# Patient Record
Sex: FEMALE | Race: WHITE | NOT HISPANIC OR LATINO | URBAN - METROPOLITAN AREA
[De-identification: names, ages, dates, MRNs, and addresses within clinical notes are randomized per-mention and may not be internally consistent; named-entity substitution may affect disease eponyms.]

---

## 2018-01-23 ENCOUNTER — IMPORTED ENCOUNTER (OUTPATIENT)
Dept: URBAN - METROPOLITAN AREA CLINIC 1 | Facility: CLINIC | Age: 68
End: 2018-01-23

## 2018-01-23 PROBLEM — H04.123: Noted: 2018-01-23

## 2018-01-23 PROBLEM — H16.143: Noted: 2018-01-23

## 2018-01-23 PROBLEM — H25.813: Noted: 2018-01-23

## 2018-01-23 PROBLEM — H18.453: Noted: 2018-01-23

## 2018-01-23 PROCEDURE — 92004 COMPRE OPH EXAM NEW PT 1/>: CPT

## 2018-01-23 NOTE — PATIENT DISCUSSION
1.  Cataract OU: Observe for now without intervention. The patient was advised to contact us if any change or worsening of vision2. TRUNG w/ PEK OU-The use of artificial tears OU TID Routinely were recommended. 3.  Salzmann's Corneal Dystrophy OU: Mild Asymptomatic. Described condition to patient. 4. Return for an appointment for 1yr/30 with Dr. Christin Nieto.

## 2019-01-22 ENCOUNTER — IMPORTED ENCOUNTER (OUTPATIENT)
Dept: URBAN - METROPOLITAN AREA CLINIC 1 | Facility: CLINIC | Age: 69
End: 2019-01-22

## 2019-01-22 PROBLEM — H25.813: Noted: 2019-01-22

## 2019-01-22 PROBLEM — H04.123: Noted: 2019-01-22

## 2019-01-22 PROBLEM — H18.50: Noted: 2019-01-22

## 2019-01-22 PROBLEM — H16.143: Noted: 2019-01-22

## 2019-01-22 PROCEDURE — 92015 DETERMINE REFRACTIVE STATE: CPT

## 2019-01-22 PROCEDURE — 92014 COMPRE OPH EXAM EST PT 1/>: CPT

## 2019-01-22 NOTE — PATIENT DISCUSSION
1.  Cataract OU:  Visually Significant secondary to glare discussed the risks benefits alternatives and limitations of cataract surgery. The patient stated a full understanding and a desire to proceed with the procedure. The patient will need to return for preop appointment with cataract measurements and to have any additional questions answered and start pre-operative eye drops as directed. Ready to schedule phaco PCL OS then OD2. TRUNG w/ PEK OU- No improvement. The increase of artificial tears OU to TID were recommended routinely. 3.  Salzmann's Corneal Dystrophy OU: Asymptomatic. Condition discussed w/ patient. 4. Return for an appointment for dfe/glare in 6 months with Dr. Christin Nieto.

## 2019-02-21 ENCOUNTER — IMPORTED ENCOUNTER (OUTPATIENT)
Dept: URBAN - METROPOLITAN AREA CLINIC 1 | Facility: CLINIC | Age: 69
End: 2019-02-21

## 2019-02-21 PROBLEM — H25.813: Noted: 2019-02-21

## 2019-02-21 PROCEDURE — 92136 OPHTHALMIC BIOMETRY: CPT

## 2019-02-21 NOTE — PATIENT DISCUSSION
1.  Cataract OU:  Visually Significant secondary to glare discussed the risks benefits alternatives and limitations of cataract surgery. The patient stated a full understanding and a desire to proceed with the procedure. Instructed/ discussed with patient if gtts are expensive (over 150 dollars) to call our office so we can recommend alternatives. Pt understood. Recommended Toric IOL patient defers and states she is fine opting for the Standard IOL only. Pt understands they will need glasses post-op to achieve their best corrected vision. Phaco PCL OS then OD. 2.  TRUNG w/ PEK OU- Cont ATs TID OU Routinely. 3.  Salzmann's Corneal Dystrophy OU- observe.  F/u as scheduled

## 2019-02-27 ENCOUNTER — IMPORTED ENCOUNTER (OUTPATIENT)
Dept: URBAN - METROPOLITAN AREA CLINIC 1 | Facility: CLINIC | Age: 69
End: 2019-02-27

## 2019-02-28 ENCOUNTER — IMPORTED ENCOUNTER (OUTPATIENT)
Dept: URBAN - METROPOLITAN AREA CLINIC 1 | Facility: CLINIC | Age: 69
End: 2019-02-28

## 2019-02-28 PROBLEM — Z96.1: Noted: 2019-02-28

## 2019-02-28 PROCEDURE — 99024 POSTOP FOLLOW-UP VISIT: CPT

## 2019-02-28 NOTE — PATIENT DISCUSSION
POD#1 CE/IOL OS (Standard) doing well. Continue all 3 gtts as prescribed and until gone. Use Prednisolone BID OS Acular Qdaily OS Vigamox TID OS Post op Warnings Reiterated RTC as scheduled

## 2019-03-07 ENCOUNTER — IMPORTED ENCOUNTER (OUTPATIENT)
Dept: URBAN - METROPOLITAN AREA CLINIC 1 | Facility: CLINIC | Age: 69
End: 2019-03-07

## 2019-03-07 PROBLEM — H25.811: Noted: 2019-03-07

## 2019-03-07 PROCEDURE — 92136 OPHTHALMIC BIOMETRY: CPT

## 2019-03-07 NOTE — PATIENT DISCUSSION
1.  Cataract OD: Visually Significant secondary to glare discussed the risks benefits alternatives and limitations of cataract surgery. The patient stated a full understanding and a desire to proceed with the procedure. Pt understands they will need glasses post-op to achieve their best corrected vision. Recommended Toric IOL patient defers and states she is fine opting for the Standard IOL only. Phaco PCL OD 2. POW#2  CE/IOL OS (Standard) doing well.   Use Prednisolone BID OS till out Use Acular Qdaily OS till out F/u as scheduled 2nd eye

## 2019-03-13 ENCOUNTER — IMPORTED ENCOUNTER (OUTPATIENT)
Dept: URBAN - METROPOLITAN AREA CLINIC 1 | Facility: CLINIC | Age: 69
End: 2019-03-13

## 2019-03-14 ENCOUNTER — IMPORTED ENCOUNTER (OUTPATIENT)
Dept: URBAN - METROPOLITAN AREA CLINIC 1 | Facility: CLINIC | Age: 69
End: 2019-03-14

## 2019-03-14 PROBLEM — Z96.1: Noted: 2019-03-14

## 2019-03-14 PROCEDURE — 99024 POSTOP FOLLOW-UP VISIT: CPT

## 2019-03-14 NOTE — PATIENT DISCUSSION
1. POD#1 Phaco/ PCL OD (Standard) -- Doing well. Continue all 3 gtts as prescribed and until gone. Use Prednisolone BID OD Acular Qdaily OD Ocuflox TID OD Post op Warnings Reiterated 2. POW#2 Phaco/ PCL OS (Standard) -- Doing well. Continue all 3 gtts as prescribed and until gone. Use Prednisolone BID OS till out Use Acular Qdaily OS till out Post op Warnings Reiterated RTC as scheduled

## 2019-04-04 ENCOUNTER — IMPORTED ENCOUNTER (OUTPATIENT)
Dept: URBAN - METROPOLITAN AREA CLINIC 1 | Facility: CLINIC | Age: 69
End: 2019-04-04

## 2019-04-04 PROBLEM — Z96.1: Noted: 2019-04-04

## 2019-04-04 PROCEDURE — 99024 POSTOP FOLLOW-UP VISIT: CPT

## 2019-04-04 NOTE — PATIENT DISCUSSION
POM#1 CE/IOL OU (Standard OU) doing well. Use Prednisolone BID OD till out Use Acular Qdaily OD till out MRX for glasses givenReturn for an appointment in January 30 with Dr. Gildardo Hyde.

## 2020-02-10 ENCOUNTER — IMPORTED ENCOUNTER (OUTPATIENT)
Dept: URBAN - METROPOLITAN AREA CLINIC 1 | Facility: CLINIC | Age: 70
End: 2020-02-10

## 2020-02-10 PROBLEM — Z96.1: Noted: 2020-02-10

## 2020-02-10 PROBLEM — H26.493: Noted: 2020-02-10

## 2020-02-10 PROBLEM — H04.123: Noted: 2020-02-10

## 2020-02-10 PROBLEM — H16.143: Noted: 2020-02-10

## 2020-02-10 PROBLEM — H26.491: Noted: 2020-02-10

## 2020-02-10 PROBLEM — H18.50: Noted: 2020-02-10

## 2020-02-10 PROCEDURE — 92014 COMPRE OPH EXAM EST PT 1/>: CPT

## 2020-02-10 NOTE — PATIENT DISCUSSION
1.  TRUNG w/ PEK OU -- Continue the use of ATs TID OU. 2.  PCO OU -- (Posterior Capsule Opacification)   Observe and consider yag cap when pt feels pco visually significant and visual acuity decreases to appropriate level. 3. Salzmann's Corneal Dystrophy OU -- Stable. Asymptomatic. Condition discussed w/ patient. 4. Pseudophakia OU -- Standard OU. Doing well. Patient defers MRx today. Return for an appointment in 1 year for a 30/glare with Dr. Nickie Dial.

## 2020-04-08 NOTE — PATIENT DISCUSSION
"""Macular Hole OD. MAC OCT today shows stable to that of last visit.  Patient followed by Dr. Krish Aguilera

## 2021-02-15 ENCOUNTER — IMPORTED ENCOUNTER (OUTPATIENT)
Dept: URBAN - METROPOLITAN AREA CLINIC 1 | Facility: CLINIC | Age: 71
End: 2021-02-15

## 2021-02-15 PROBLEM — H04.123: Noted: 2021-02-15

## 2021-02-15 PROBLEM — Z96.1: Noted: 2021-02-15

## 2021-02-15 PROBLEM — H18.453: Noted: 2021-02-15

## 2021-02-15 PROBLEM — H26.493: Noted: 2021-02-15

## 2021-02-15 PROBLEM — H16.143: Noted: 2021-02-15

## 2021-02-15 PROCEDURE — 92014 COMPRE OPH EXAM EST PT 1/>: CPT

## 2021-02-15 NOTE — PATIENT DISCUSSION
1.  TRUNG w/ PEK OU- Recommend ATs TID OU routinely 2. PCO OU: (Posterior Capsule Opacification)   Observe and consider yag cap when pt feels pco visually significant and visual acuity decreases to appropriate level. 3. Pseudophakia OU - (Standard OU) 4. Salzmann's Corneal Dystrophy OU: Stable. Continue to observe  Patient deferred [facility] Rx today. Return for an appointment in 1 year 30/glare with Dr. Nicol Kumar.

## 2021-06-08 NOTE — PATIENT DISCUSSION
PCO ([id number]): Visually significant PCO present on exam today. Recommend YAG laser capsulotomy to improve vision and decrease glare symptoms. RBAs of procedure discussed. Patient agrees and wishes to proceed.

## 2022-02-13 ENCOUNTER — PREPPED CHART (OUTPATIENT)
Dept: URBAN - METROPOLITAN AREA CLINIC 1 | Facility: CLINIC | Age: 72
End: 2022-02-13

## 2022-02-14 ASSESSMENT — VISUAL ACUITY
OS_CC: 20/25
OD_CC: 20/20-2

## 2022-02-14 ASSESSMENT — TONOMETRY
OS_IOP_MMHG: 18
OD_IOP_MMHG: 18

## 2022-02-15 ENCOUNTER — COMPREHENSIVE EXAM (OUTPATIENT)
Dept: URBAN - METROPOLITAN AREA CLINIC 1 | Facility: CLINIC | Age: 72
End: 2022-02-15

## 2022-02-15 DIAGNOSIS — H16.143: ICD-10-CM

## 2022-02-15 DIAGNOSIS — Z96.1: ICD-10-CM

## 2022-02-15 DIAGNOSIS — H26.493: ICD-10-CM

## 2022-02-15 DIAGNOSIS — H04.123: ICD-10-CM

## 2022-02-15 DIAGNOSIS — H18.453: ICD-10-CM

## 2022-02-15 PROCEDURE — 92014 COMPRE OPH EXAM EST PT 1/>: CPT

## 2022-02-15 PROCEDURE — 92015 DETERMINE REFRACTIVE STATE: CPT

## 2022-02-15 ASSESSMENT — VISUAL ACUITY
OD_BAT: 20/60
OS_CC: J2
OD_CC: 20/30
OD_CC: J2
OS_CC: 20/30
OS_BAT: 20/60

## 2022-02-15 ASSESSMENT — TONOMETRY
OD_IOP_MMHG: 18
OS_IOP_MMHG: 18

## 2022-02-15 NOTE — PATIENT DISCUSSION
(Surgical)  Visually Significant secondary to glare; schedule YAG Cap OD then OS. Pros, cons, risks and benefits discussed with patient. Patient wishes to proceed.

## 2022-03-04 ENCOUNTER — CLINIC PROCEDURE ONLY (OUTPATIENT)
Dept: URBAN - METROPOLITAN AREA CLINIC 1 | Facility: CLINIC | Age: 72
End: 2022-03-04

## 2022-03-04 DIAGNOSIS — H26.491: ICD-10-CM

## 2022-03-04 PROCEDURE — 66821 AFTER CATARACT LASER SURGERY: CPT

## 2022-03-04 NOTE — PROCEDURE NOTE: CLINICAL
PROCEDURE NOTE: YAG Capsulotomy OD. Diagnosis: Other Secondary Cataract. Anesthesia: Topical. The purpose and nature of the procedure, possible alternative methods of treatment, the risks involved and the possibility of complications were discussed with patient. The Patient wishes to proceed and the consent was signed. 1 gtt Prolensa applied. The laser was then performed under topical anesthesia with no complications. Post op instructions were given to patient as well as a follow-up appointment. Patient was advised to call our office if any questions or concerns. Yumiko Somers

## 2022-03-18 ENCOUNTER — CLINIC PROCEDURE ONLY (OUTPATIENT)
Dept: URBAN - METROPOLITAN AREA CLINIC 1 | Facility: CLINIC | Age: 72
End: 2022-03-18

## 2022-03-18 DIAGNOSIS — H26.492: ICD-10-CM

## 2022-03-18 PROCEDURE — 66821 AFTER CATARACT LASER SURGERY: CPT

## 2022-03-18 NOTE — PROCEDURE NOTE: CLINICAL
PROCEDURE NOTE: YAG Capsulotomy OS. Diagnosis: Posterior Capsular Opacity. Anesthesia: Topical. The purpose and nature of the procedure, possible alternative methods of treatment, the risks involved and the possibility of complications were discussed with patient. The Patient wishes to proceed and the consent was signed. 1 gtt Prolensa applied. The laser was then performed under topical anesthesia with no complications. Post op instructions were given to patient as well as a follow-up appointment. Patient was advised to call our office if any questions or concerns. Joy Martinez

## 2022-04-02 ASSESSMENT — VISUAL ACUITY
OD_CC: 20/25
OS_CC: 20/60
OS_CC: 20/70
OD_GLARE: 20/150
OS_GLARE: 20/50
OD_SC: 20/20-2
OS_SC: 20/20
OS_CC: J1
OS_CC: J1
OD_SC: 20/25+2
OD_CC: J1
OS_CC: 20/50
OD_SC: 20/25
OU_SC: J2
OS_SC: 20/20
OS_SC: 20/25
OS_CC: 20/70
OS_SC: 20/20
OD_CC: 20/25
OD_GLARE: 20/40
OD_SC: 20/25-2
OD_SC: 20/20
OD_GLARE: 20/150
OD_SC: 20/25-2
OS_GLARE: 20/150
OD_CC: J1
OD_GLARE: 20/150
OS_SC: 20/20
OS_GLARE: 20/150

## 2022-04-02 ASSESSMENT — TONOMETRY
OD_IOP_MMHG: 22
OD_IOP_MMHG: 18
OS_IOP_MMHG: 14
OS_IOP_MMHG: 16
OD_IOP_MMHG: 16
OD_IOP_MMHG: 16
OS_IOP_MMHG: 18
OS_IOP_MMHG: 14
OS_IOP_MMHG: 18
OD_IOP_MMHG: 14
OS_IOP_MMHG: 16
OD_IOP_MMHG: 15
OS_IOP_MMHG: 15
OS_IOP_MMHG: 15

## 2022-04-29 ENCOUNTER — POST-OP (OUTPATIENT)
Dept: URBAN - METROPOLITAN AREA CLINIC 1 | Facility: CLINIC | Age: 72
End: 2022-04-29

## 2022-04-29 DIAGNOSIS — Z98.890: ICD-10-CM

## 2022-04-29 PROCEDURE — 99024 POSTOP FOLLOW-UP VISIT: CPT

## 2022-04-29 ASSESSMENT — TONOMETRY
OD_IOP_MMHG: 16
OS_IOP_MMHG: 16

## 2022-04-29 ASSESSMENT — VISUAL ACUITY
OS_CC: J1
OD_CC: J1
OS_CC: 20/20-2
OD_CC: 20/20

## 2023-04-28 ENCOUNTER — COMPREHENSIVE EXAM (OUTPATIENT)
Dept: URBAN - METROPOLITAN AREA CLINIC 1 | Facility: CLINIC | Age: 73
End: 2023-04-28

## 2023-04-28 DIAGNOSIS — H18.453: ICD-10-CM

## 2023-04-28 DIAGNOSIS — Z96.1: ICD-10-CM

## 2023-04-28 DIAGNOSIS — H04.123: ICD-10-CM

## 2023-04-28 PROCEDURE — 92015 DETERMINE REFRACTIVE STATE: CPT

## 2023-04-28 PROCEDURE — 99214 OFFICE O/P EST MOD 30 MIN: CPT

## 2023-04-28 ASSESSMENT — TONOMETRY
OD_IOP_MMHG: 16
OS_IOP_MMHG: 16

## 2023-04-28 ASSESSMENT — VISUAL ACUITY
OS_CC: 20/20
OS_CC: J1+
OD_CC: J1+
OD_CC: 20/20

## 2024-05-10 ENCOUNTER — COMPREHENSIVE EXAM (OUTPATIENT)
Dept: URBAN - METROPOLITAN AREA CLINIC 1 | Facility: CLINIC | Age: 74
End: 2024-05-10

## 2024-05-10 DIAGNOSIS — Z96.1: ICD-10-CM

## 2024-05-10 DIAGNOSIS — H04.123: ICD-10-CM

## 2024-05-10 DIAGNOSIS — H18.453: ICD-10-CM

## 2024-05-10 PROCEDURE — 99214 OFFICE O/P EST MOD 30 MIN: CPT

## 2024-05-10 ASSESSMENT — VISUAL ACUITY
OS_CC: J1+
OD_CC: 20/20
OD_CC: J1+
OS_CC: 20/25-2

## 2024-05-10 ASSESSMENT — TONOMETRY
OS_IOP_MMHG: 16
OD_IOP_MMHG: 16

## 2025-05-05 ENCOUNTER — COMPREHENSIVE EXAM (OUTPATIENT)
Age: 75
End: 2025-05-05

## 2025-05-05 DIAGNOSIS — Z96.1: ICD-10-CM

## 2025-05-05 DIAGNOSIS — H16.143: ICD-10-CM

## 2025-05-05 DIAGNOSIS — H04.123: ICD-10-CM

## 2025-05-05 DIAGNOSIS — H18.453: ICD-10-CM

## 2025-05-05 PROCEDURE — 99214 OFFICE O/P EST MOD 30 MIN: CPT
